# Patient Record
Sex: MALE | Race: WHITE
[De-identification: names, ages, dates, MRNs, and addresses within clinical notes are randomized per-mention and may not be internally consistent; named-entity substitution may affect disease eponyms.]

---

## 2022-12-02 ENCOUNTER — HOSPITAL ENCOUNTER (EMERGENCY)
Dept: HOSPITAL 4 - SED | Age: 47
Discharge: HOME | End: 2022-12-02
Payer: MEDICAID

## 2022-12-02 VITALS — HEIGHT: 74 IN | WEIGHT: 230 LBS | BODY MASS INDEX: 29.52 KG/M2

## 2022-12-02 VITALS — SYSTOLIC BLOOD PRESSURE: 142 MMHG

## 2022-12-02 DIAGNOSIS — Z79.899: ICD-10-CM

## 2022-12-02 DIAGNOSIS — H57.11: ICD-10-CM

## 2022-12-02 DIAGNOSIS — H33.21: Primary | ICD-10-CM

## 2022-12-02 NOTE — NUR
Patient does not wish to proceed with medical care recommended by DR. SUMANTH MD . 
 Patient given information related to possible complications, up to and 
including death, which could occur as a result of leaving hospital at this 
time.  Patient verbalizes understanding of risks involved leaving against 
medical advice.  Patient has signed AMA form.

## 2022-12-02 NOTE — NUR
Patient triaged and placed in waiting room. VSS and patient appears in no acute 
distress at this time. Accompanied by WIFE, awaiting available bed, and DR. SUMANTH CHRISTINE notified of need for MSE.